# Patient Record
(demographics unavailable — no encounter records)

---

## 2025-05-03 NOTE — HISTORY OF PRESENT ILLNESS
[de-identified] : 05/03/2025: 46 y/o RHD Male presenting for eval of left elbow/biceps. Was docking a boat last night, while grabbing piling he felt 3 pops in his elbow followed by pain. 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

## 2025-05-03 NOTE — IMAGING
[de-identified] : left elbow examination: distal biceps deformity weakness on supination against resistance 2/5 TTP: distal biceps  no ttp over left anterior shoulder ROM: full in all planes Sensation: wnls Strength: 5/5 in all planes (with exception of above) There is no evidence of ligamentous laxity with valgus/varus testing. There is no palpable deformity.  Ipsilateral shoulder: full and pain free with no ttp.   Left elbow 3 view xray showed no acute bony pathology.

## 2025-05-03 NOTE — ASSESSMENT
[FreeTextEntry1] : The patient was advised of the diagnosis. The natural history of the pathology was explained in full to the patient in layman's terms. All questions were answered. The risks and benefits of surgical and non-surgical treatment alternatives were explained in full to the patient.  Pt referred for stat MRI to assess for distal bices tendon rupture for surgical planning Will rto s/p MRI to discuss results and tx options.

## 2025-05-06 NOTE — ASSESSMENT
[FreeTextEntry1] : The patient was advised of the diagnosis. The natural history of the pathology was explained in full to the patient in layman's terms. All questions were answered. The risks and benefits of surgical and non-surgical treatment alternatives were explained in full to the patient.  Risks including but not limited to infection, blood loss, tendon damage and delayed tendon disruption, muscle damage, nerve damage, stiffness, pain, arthritis, loss of function, potential for secondary surgery, loss of limb or life. The patient had the opportunity to ask questions and all questions were answered to their satisfaction.  pt will be scheduled for left distal biceps tendon repair.

## 2025-05-06 NOTE — IMAGING
[de-identified] : left elbow examination: distal biceps deformity weakness on supination against resistance 2/5 TTP: distal biceps  no ttp over left anterior shoulder ROM: full in all planes Sensation: wnls Strength: 5/5 in all planes (with exception of above) There is no evidence of ligamentous laxity with valgus/varus testing. There is no palpable deformity.  Ipsilateral shoulder: full and pain free with no ttp.   05/03/2025: Left elbow 3 view xray showed no acute bony pathology.

## 2025-05-06 NOTE — HISTORY OF PRESENT ILLNESS
[de-identified] : 0506/2025: Pt here s/p left elbow MRI Images reviewed by me today. My independent interpretation of this test is confirmed left distal biceps tendon rupture.   05/03/2025: 46 y/o RHD Male presenting for eval of left elbow/biceps. Was docking a boat last night, while grabbing piling he felt 3 pops in his elbow followed by pain. 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

## 2025-05-20 NOTE — IMAGING
[de-identified] : left elbow  wound clean dry and intact no erythema no drainage near FAROM NV unchanged sutures removed

## 2025-05-20 NOTE — ASSESSMENT
[FreeTextEntry1] : The patient was advised of the diagnosis. The natural history of the pathology was explained in full to the patient in layman's terms. All questions were answered. The risks and benefits of surgical and non-surgical treatment alternatives were explained in full to the patient.  Wound care discussed.  Strict NWB, limitations discussed  RTO 4 weeks

## 2025-05-20 NOTE — HISTORY OF PRESENT ILLNESS
[de-identified] : DOS 5/9/25 - LEFT DISTAL BICEPS TENDON REPAIR  05/20/2025: Here for PO #1 s/p left distal biceps repair. Doing well since surgery, experiencing pain in elbow feeling like tendinitis   05/06/2025: Pt here s/p left elbow MRI Images reviewed by me today. My independent interpretation of this test is confirmed left distal biceps tendon rupture.   05/03/2025: 46 y/o RHD Male presenting for eval of left elbow/biceps. Was docking a boat last night, while grabbing piling he felt 3 pops in his elbow followed by pain. 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